# Patient Record
Sex: FEMALE | Race: AMERICAN INDIAN OR ALASKA NATIVE | Employment: UNEMPLOYED | URBAN - METROPOLITAN AREA
[De-identification: names, ages, dates, MRNs, and addresses within clinical notes are randomized per-mention and may not be internally consistent; named-entity substitution may affect disease eponyms.]

---

## 2018-10-26 ENCOUNTER — HOSPITAL ENCOUNTER (OUTPATIENT)
Dept: ULTRASOUND IMAGING | Facility: CLINIC | Age: 23
Discharge: HOME | End: 2018-10-26
Attending: NURSE PRACTITIONER | Admitting: NURSE PRACTITIONER
Payer: COMMERCIAL

## 2018-10-26 DIAGNOSIS — Z32.01 ENCOUNTER FOR PREGNANCY TEST, RESULT POSITIVE: ICD-10-CM

## 2018-10-26 PROCEDURE — 76805 OB US >/= 14 WKS SNGL FETUS: CPT

## 2018-11-28 LAB
C TRACH DNA SPEC QL PROBE+SIG AMP: POSITIVE
HBV SURFACE AG SERPL QL IA: NORMAL
HEMOGLOBIN: 10.8 G/DL (ref 11.7–15.7)
HIV 1+2 AB+HIV1 P24 AG SERPL QL IA: NORMAL
N GONORRHOEA DNA SPEC QL PROBE+SIG AMP: NOT DETECTED
PLATELET # BLD AUTO: 333 10^9/L
RUBELLA ABY IGG: NORMAL
TREPONEMA ANTIBODIES: NORMAL

## 2019-01-16 LAB
GLU GEST SCREEN 1HR 50G: 77
HEMOGLOBIN: 9.9 G/DL (ref 11.7–15.7)

## 2019-03-16 ENCOUNTER — HOSPITAL ENCOUNTER (OUTPATIENT)
Facility: CLINIC | Age: 24
Discharge: HOME | End: 2019-03-16
Attending: OBSTETRICS & GYNECOLOGY | Admitting: OBSTETRICS & GYNECOLOGY
Payer: COMMERCIAL

## 2019-03-16 VITALS
DIASTOLIC BLOOD PRESSURE: 62 MMHG | WEIGHT: 238 LBS | SYSTOLIC BLOOD PRESSURE: 136 MMHG | TEMPERATURE: 98.2 F | BODY MASS INDEX: 37.35 KG/M2 | HEIGHT: 67 IN | RESPIRATION RATE: 20 BRPM

## 2019-03-16 DIAGNOSIS — O47.9 BRAXTON HICKS CONTRACTIONS: Primary | ICD-10-CM

## 2019-03-16 PROBLEM — Z36.89 ENCOUNTER FOR TRIAGE IN PREGNANT PATIENT: Status: ACTIVE | Noted: 2019-03-16

## 2019-03-16 LAB
ALBUMIN UR-MCNC: 30 MG/DL
AMPHETAMINES UR QL SCN: NEGATIVE
APPEARANCE UR: ABNORMAL
BACTERIA #/AREA URNS HPF: ABNORMAL /HPF
BILIRUB UR QL STRIP: NEGATIVE
CANNABINOIDS UR QL: NEGATIVE
COCAINE UR QL: NEGATIVE
COLOR UR AUTO: YELLOW
GLUCOSE UR STRIP-MCNC: NEGATIVE MG/DL
HGB UR QL STRIP: NEGATIVE
KETONES UR STRIP-MCNC: 5 MG/DL
LEUKOCYTE ESTERASE UR QL STRIP: NEGATIVE
MUCOUS THREADS #/AREA URNS LPF: PRESENT /LPF
NITRATE UR QL: NEGATIVE
OPIATES UR QL SCN: NEGATIVE
PCP UR QL SCN: NEGATIVE
PH UR STRIP: 6 PH (ref 5–7)
RBC #/AREA URNS AUTO: 0 /HPF (ref 0–2)
SOURCE: ABNORMAL
SP GR UR STRIP: 1.02 (ref 1–1.03)
SQUAMOUS #/AREA URNS AUTO: 10 /HPF (ref 0–1)
UROBILINOGEN UR STRIP-MCNC: NORMAL MG/DL (ref 0–2)
WBC #/AREA URNS AUTO: 3 /HPF (ref 0–5)

## 2019-03-16 PROCEDURE — G0463 HOSPITAL OUTPT CLINIC VISIT: HCPCS

## 2019-03-16 PROCEDURE — 87491 CHLMYD TRACH DNA AMP PROBE: CPT | Performed by: STUDENT IN AN ORGANIZED HEALTH CARE EDUCATION/TRAINING PROGRAM

## 2019-03-16 PROCEDURE — 87591 N.GONORRHOEAE DNA AMP PROB: CPT | Performed by: STUDENT IN AN ORGANIZED HEALTH CARE EDUCATION/TRAINING PROGRAM

## 2019-03-16 PROCEDURE — 80307 DRUG TEST PRSMV CHEM ANLYZR: CPT | Performed by: OBSTETRICS & GYNECOLOGY

## 2019-03-16 PROCEDURE — 81001 URINALYSIS AUTO W/SCOPE: CPT | Performed by: OBSTETRICS & GYNECOLOGY

## 2019-03-16 RX ORDER — HYDROXYZINE HYDROCHLORIDE 25 MG/1
25-50 TABLET, FILM COATED ORAL EVERY 8 HOURS PRN
Qty: 20 TABLET | Refills: 0 | Status: ON HOLD | OUTPATIENT
Start: 2019-03-16 | End: 2019-03-18

## 2019-03-16 RX ORDER — PRENATAL VIT/IRON FUM/FOLIC AC 27MG-0.8MG
1 TABLET ORAL DAILY
COMMUNITY

## 2019-03-16 RX ORDER — ACETAMINOPHEN 500 MG
500-1000 TABLET ORAL EVERY 6 HOURS PRN
Status: ON HOLD | COMMUNITY
End: 2019-03-18

## 2019-03-16 ASSESSMENT — MIFFLIN-ST. JEOR: SCORE: 1849.25

## 2019-03-16 NOTE — PROGRESS NOTES
[facility]  OB History and Physical      Chula Hitchcock MRN# [id number]   Age: 24 year old YOB: 1995     HPI:  Ms. Chula Hitchcock is a 24 year old  at 39w6d by 19w5d US, who presents with contractions that are 5min apart that started 7 hours ago at 0800. Notes normal fetal movement. Denies headache, vision changes, chest pain, SOB, epigastric/RUQ pain, acute worsening in bilateral lower extremities swelling. Denies vaginal bleeding, change in vaginal discharge/odor, gush of fluid like rupture of membrane, vaginal pain, vaginal itchiness, or vaginal pressure. Denies dysuria, change in urinary frequency, change in urinary urgency, or hematuria.    Pregnancy Complications:  - Class II obesity  - H/o chlamydia at the beginning of this pregnancy, treated  - H/o UTI in this pregnancy  - Anemia in third trimester    Prenatal Labs:   Height: 168.91cm  Weight: 108.18kg, 238lb  BMI: 38.4  T&S: O pos, Rh neg.  Hgb: 10.7 on 2018, 9.9 on 2019  HgbA1c: 4.9 on   Plt: 333 on 2018  GCT: passed  GBS: unknown  Rubella: immune  HIV/HepB/RPR: neg  GC: neg  Chlam: pos  PAP smear: unknown  Tdap given in this pregnancy 2019  Placental location: Anterior, no previa    Ultrasounds  - Anatomy US on 10/26/2018 at 19w5d: wnl    OB History  Obstetric History       T0      L2     SAB0   TAB0   Ectopic0   Multiple0   Live Births2       # Outcome Date GA Lbr Alejandro/2nd Weight Sex Delivery Anes PTL Lv   3 Current            2 Para 14  08:54 3.799 kg (8 lb 6 oz) F Vag-Spont EPI Y BORA      Apgar1:  9                Apgar5: 9   1 Para 13   4.054 kg (8 lb 15 oz) F  EPI N BORA      Denied pregnancy complication, like gHTN, GDM, or other problems.    PMHx:   No past medical history on file.   Denied personal h/o HTN, diabetes.    PSHx:   No past surgical history on file.  Meds:   Medications Prior to Admission   Medication Sig Dispense Refill  Last Dose     acetaminophen (TYLENOL) 500 MG tablet Take 500-1,000 mg by mouth every 6 hours as needed for mild pain   3/16/2019 at Unknown time     ferrous gluconate 325 (36 FE) MG TABS Take 324 mg by mouth 3 times daily (with meals) 90 tablet 3 Past Week at Unknown time     Prenatal Vit-Fe Fumarate-FA (PRENATAL MULTIVITAMIN W/IRON) 27-0.8 MG tablet Take 1 tablet by mouth daily   Past Week at Unknown time     docusate sodium 100 MG tablet Take 100 mg by mouth daily 60 tablet 1 Unknown at Unknown time     Allergies:  No Known Allergies   FmHx: No family history on file.  SocHx: She denied any tobacco, alcohol, or other drug use during this pregnancy.    ROS:   Complete 10-point ROS negative except as noted in HPI.    PE:  Vit:   Patient Vitals for the past 4 hrs:   BP Temp Temp src Heart Rate Resp   19 1435 139/66 98.2  F (36.8  C) Oral 97 20      Gen: Well-appearing, NAD, comfortable   CV: rrr, no mrg   Pulm: Ctab, no wheezes or crackles   Abd: Soft, gravid, non-tender  Ext: trace LE edema b/l  Cx: 2.5/50/-2, posterior, medium; unchanged over > 2hours of monitoring   EFW:  8# by Leopold's    FHT: Baseline 130, modeate variability, accelerations present, no decelerations   [place]: 0-1 contractions in 10 minutes      Assessment/Plan   Chula Hitchcock is a 24 year old  at 39w6d by 19w5d US, who presents with painful contractions and here for rule out of labor.    1. Labor:   - Patient is not making cervical change and only has a few contractions on toco. She is not in labor at this time.  - she should return to the hospital with regular contractions, loss fluid, vaginal bleeding or decreased fetal movement. Discussed with patient  - PNC: Rh positive, Rubella immune, GBS unknown, GCT passed, Placenta anterior    2. FWB:   - Category I FHT.  Continuous EFM and toco.    3. H/o unconfirmed treated chlamydia in this pregnancy  - CG/chlam collected and pending at time of discharge     The patient was  discussed with Dr. Gamboa who is in agreement with the treatment plan.    Ashli Cornelius MD  Ob/Gyn, PGY-1  3/16/2019, 2:04 PM    [facility] staff:  Appreciate note by Dr. Cornelius.  I have seen and examined the patient without the resident. I have reviewed, edited, and agree with the note.      Sharri Gamboa MD, FACOG  3/16/2019  5:10 PM

## 2019-03-16 NOTE — PLAN OF CARE
Data: [facility] at [facility]3.   Reason for maternal/fetal assessment per patient is Contractions (Since 0800)  . Patient is a . Prenatal record reviewed.      Obstetric History       T0      L2     SAB0   TAB0   Ectopic0   Multiple0   Live Births2       # Outcome Date GA Lbr Alejandro/2nd Weight Sex Delivery Anes PTL Lv   3 Current            2 Para 14  08:54 3.799 kg (8 lb 6 oz) F Vag-Spont EPI Y BORA      Apgar1:  9                Apgar5: 9   1 Para 13   4.054 kg (8 lb 15 oz) F  EPI N BORA         Medical History: No past medical history on file.. Gestational Age 39w6d. VSS. Cervix: 2.5/50/-2/posterior-medium consistency- unchanged exams 2 hours apart. Fetal movement present. Patient denies cramping, backache, vaginal discharge, pelvic pressure, UTI symptoms, GI problems, bloody show, vaginal bleeding, edema, headache, visual disturbances, epigastric or URQ pain, abdominal pain, rupture of membranes. Support person present.  Action: Verbal consent for EFM. Triage assessment completed. EFM applied for labor evaluation. Fetal assessment: Presumed adequate fetal oxygenation documented (see flow record). Patient instructed to report change in fetal movement, vaginal leaking of fluid or bleeding, abdominal pain, or any concerns related to the pregnancy to her nurse/physician.   Response: Jamil Cornelius, Mikhail, and Cooper informed of patient arrival and status. Plan per provider is discharge home, instructions to return with labor precautions. Patient verbalized understanding of education and verbalized agreement with plan. Discharged ambulatory at 1715.

## 2019-03-16 NOTE — PLAN OF CARE
Patient up to walk until cervical recheck. Patient notified of results from urine sent, ketones noted and fluids and snacks encouraged.

## 2019-03-17 ENCOUNTER — TELEPHONE (OUTPATIENT)
Dept: OBGYN | Facility: CLINIC | Age: 24
End: 2019-03-17

## 2019-03-17 ENCOUNTER — ANESTHESIA EVENT (OUTPATIENT)
Dept: OBGYN | Facility: CLINIC | Age: 24
End: 2019-03-17
Payer: COMMERCIAL

## 2019-03-17 ENCOUNTER — ANESTHESIA (OUTPATIENT)
Dept: OBGYN | Facility: CLINIC | Age: 24
End: 2019-03-17
Payer: COMMERCIAL

## 2019-03-17 ENCOUNTER — HOSPITAL ENCOUNTER (INPATIENT)
Facility: CLINIC | Age: 24
LOS: 2 days | Discharge: HOME | End: 2019-03-19
Attending: OBSTETRICS & GYNECOLOGY | Admitting: OBSTETRICS & GYNECOLOGY
Payer: COMMERCIAL

## 2019-03-17 DIAGNOSIS — A74.9 CHLAMYDIA INFECTION: Primary | ICD-10-CM

## 2019-03-17 DIAGNOSIS — D62 ACUTE ON CHRONIC BLOOD LOSS ANEMIA: ICD-10-CM

## 2019-03-17 PROBLEM — Z34.90 PREGNANCY: Status: ACTIVE | Noted: 2019-03-17

## 2019-03-17 LAB
ABO + RH BLD: NORMAL
ABO + RH BLD: NORMAL
BASOPHILS # BLD AUTO: 0 10E9/L (ref 0–0.2)
BASOPHILS NFR BLD AUTO: 0.1 %
BLD GP AB SCN SERPL QL: NORMAL
BLOOD BANK CMNT PATIENT-IMP: NORMAL
C TRACH DNA SPEC QL NAA+PROBE: POSITIVE
DIFFERENTIAL METHOD BLD: ABNORMAL
EOSINOPHIL # BLD AUTO: 0 10E9/L (ref 0–0.7)
EOSINOPHIL NFR BLD AUTO: 0.3 %
ERYTHROCYTE [DISTWIDTH] IN BLOOD BY AUTOMATED COUNT: 15 % (ref 10–15)
GLUCOSE BLDC GLUCOMTR-MCNC: 94 MG/DL (ref 70–99)
HCT VFR BLD AUTO: 35.4 % (ref 35–47)
HGB BLD-MCNC: 10.9 G/DL (ref 11.7–15.7)
IMM GRANULOCYTES # BLD: 0 10E9/L (ref 0–0.4)
IMM GRANULOCYTES NFR BLD: 0.2 %
LYMPHOCYTES # BLD AUTO: 1.5 10E9/L (ref 0.8–5.3)
LYMPHOCYTES NFR BLD AUTO: 16.1 %
MCH RBC QN AUTO: 24.2 PG (ref 26.5–33)
MCHC RBC AUTO-ENTMCNC: 30.8 G/DL (ref 31.5–36.5)
MCV RBC AUTO: 79 FL (ref 78–100)
MONOCYTES # BLD AUTO: 0.3 10E9/L (ref 0–1.3)
MONOCYTES NFR BLD AUTO: 3.5 %
N GONORRHOEA DNA SPEC QL NAA+PROBE: NEGATIVE
NEUTROPHILS # BLD AUTO: 7.4 10E9/L (ref 1.6–8.3)
NEUTROPHILS NFR BLD AUTO: 79.8 %
NRBC # BLD AUTO: 0 10*3/UL
NRBC BLD AUTO-RTO: 0 /100
PLATELET # BLD AUTO: 312 10E9/L (ref 150–450)
RBC # BLD AUTO: 4.5 10E12/L (ref 3.8–5.2)
SPECIMEN EXP DATE BLD: NORMAL
SPECIMEN SOURCE: ABNORMAL
SPECIMEN SOURCE: NORMAL
WBC # BLD AUTO: 9.3 10E9/L (ref 4–11)

## 2019-03-17 PROCEDURE — 86900 BLOOD TYPING SEROLOGIC ABO: CPT | Performed by: STUDENT IN AN ORGANIZED HEALTH CARE EDUCATION/TRAINING PROGRAM

## 2019-03-17 PROCEDURE — 59025 FETAL NON-STRESS TEST: CPT

## 2019-03-17 PROCEDURE — 25000128 H RX IP 250 OP 636: Performed by: STUDENT IN AN ORGANIZED HEALTH CARE EDUCATION/TRAINING PROGRAM

## 2019-03-17 PROCEDURE — 00HU33Z INSERTION OF INFUSION DEVICE INTO SPINAL CANAL, PERCUTANEOUS APPROACH: ICD-10-PCS | Performed by: ANESTHESIOLOGY

## 2019-03-17 PROCEDURE — 86850 RBC ANTIBODY SCREEN: CPT | Performed by: STUDENT IN AN ORGANIZED HEALTH CARE EDUCATION/TRAINING PROGRAM

## 2019-03-17 PROCEDURE — 86901 BLOOD TYPING SEROLOGIC RH(D): CPT | Performed by: STUDENT IN AN ORGANIZED HEALTH CARE EDUCATION/TRAINING PROGRAM

## 2019-03-17 PROCEDURE — 25000125 ZZHC RX 250: Performed by: ANESTHESIOLOGY

## 2019-03-17 PROCEDURE — G0463 HOSPITAL OUTPT CLINIC VISIT: HCPCS | Mod: 25

## 2019-03-17 PROCEDURE — G0463 HOSPITAL OUTPT CLINIC VISIT: HCPCS

## 2019-03-17 PROCEDURE — 00000146 ZZHCL STATISTIC GLUCOSE BY METER IP

## 2019-03-17 PROCEDURE — 25000128 H RX IP 250 OP 636: Performed by: ANESTHESIOLOGY

## 2019-03-17 PROCEDURE — 25000125 ZZHC RX 250: Performed by: STUDENT IN AN ORGANIZED HEALTH CARE EDUCATION/TRAINING PROGRAM

## 2019-03-17 PROCEDURE — 12000001 ZZH R&B MED SURG/OB UMMC

## 2019-03-17 PROCEDURE — 85025 COMPLETE CBC W/AUTO DIFF WBC: CPT | Performed by: STUDENT IN AN ORGANIZED HEALTH CARE EDUCATION/TRAINING PROGRAM

## 2019-03-17 PROCEDURE — 3E0R3BZ INTRODUCTION OF ANESTHETIC AGENT INTO SPINAL CANAL, PERCUTANEOUS APPROACH: ICD-10-PCS | Performed by: ANESTHESIOLOGY

## 2019-03-17 PROCEDURE — 25000128 H RX IP 250 OP 636

## 2019-03-17 PROCEDURE — 25000132 ZZH RX MED GY IP 250 OP 250 PS 637: Performed by: STUDENT IN AN ORGANIZED HEALTH CARE EDUCATION/TRAINING PROGRAM

## 2019-03-17 PROCEDURE — 25800030 ZZH RX IP 258 OP 636: Performed by: STUDENT IN AN ORGANIZED HEALTH CARE EDUCATION/TRAINING PROGRAM

## 2019-03-17 PROCEDURE — 0064U ANTB TP TOTAL&RPR IA QUAL: CPT | Performed by: STUDENT IN AN ORGANIZED HEALTH CARE EDUCATION/TRAINING PROGRAM

## 2019-03-17 RX ORDER — OXYTOCIN 10 [USP'U]/ML
INJECTION, SOLUTION INTRAMUSCULAR; INTRAVENOUS
Status: DISCONTINUED
Start: 2019-03-17 | End: 2019-03-18 | Stop reason: HOSPADM

## 2019-03-17 RX ORDER — OXYTOCIN/0.9 % SODIUM CHLORIDE 30/500 ML
100-340 PLASTIC BAG, INJECTION (ML) INTRAVENOUS CONTINUOUS PRN
Status: DISCONTINUED | OUTPATIENT
Start: 2019-03-17 | End: 2019-03-18

## 2019-03-17 RX ORDER — IBUPROFEN 800 MG/1
800 TABLET, FILM COATED ORAL
Status: COMPLETED | OUTPATIENT
Start: 2019-03-17 | End: 2019-03-18

## 2019-03-17 RX ORDER — AZITHROMYCIN 500 MG/1
1000 TABLET, FILM COATED ORAL ONCE
Status: COMPLETED | OUTPATIENT
Start: 2019-03-17 | End: 2019-03-17

## 2019-03-17 RX ORDER — FENTANYL CITRATE 50 UG/ML
50-100 INJECTION, SOLUTION INTRAMUSCULAR; INTRAVENOUS
Status: DISCONTINUED | OUTPATIENT
Start: 2019-03-17 | End: 2019-03-18

## 2019-03-17 RX ORDER — SODIUM CHLORIDE, SODIUM LACTATE, POTASSIUM CHLORIDE, CALCIUM CHLORIDE 600; 310; 30; 20 MG/100ML; MG/100ML; MG/100ML; MG/100ML
INJECTION, SOLUTION INTRAVENOUS CONTINUOUS
Status: DISCONTINUED | OUTPATIENT
Start: 2019-03-17 | End: 2019-03-18

## 2019-03-17 RX ORDER — MISOPROSTOL 200 UG/1
TABLET ORAL
Status: DISCONTINUED
Start: 2019-03-17 | End: 2019-03-18 | Stop reason: HOSPADM

## 2019-03-17 RX ORDER — LIDOCAINE 40 MG/G
CREAM TOPICAL
Status: DISCONTINUED | OUTPATIENT
Start: 2019-03-17 | End: 2019-03-18

## 2019-03-17 RX ORDER — AZITHROMYCIN 500 MG/1
1000 TABLET, FILM COATED ORAL DAILY
Qty: 2 TABLET | Refills: 0 | Status: ON HOLD | OUTPATIENT
Start: 2019-03-17 | End: 2019-03-18

## 2019-03-17 RX ORDER — BUPIVACAINE HYDROCHLORIDE 2.5 MG/ML
INJECTION, SOLUTION INFILTRATION; PERINEURAL PRN
Status: DISCONTINUED | OUTPATIENT
Start: 2019-03-17 | End: 2019-03-20 | Stop reason: HOSPADM

## 2019-03-17 RX ORDER — EPHEDRINE SULFATE 50 MG/ML
INJECTION, SOLUTION INTRAMUSCULAR; INTRAVENOUS; SUBCUTANEOUS
Status: DISCONTINUED
Start: 2019-03-17 | End: 2019-03-18 | Stop reason: HOSPADM

## 2019-03-17 RX ORDER — OXYTOCIN 10 [USP'U]/ML
10 INJECTION, SOLUTION INTRAMUSCULAR; INTRAVENOUS
Status: DISCONTINUED | OUTPATIENT
Start: 2019-03-17 | End: 2019-03-18

## 2019-03-17 RX ORDER — ONDANSETRON 2 MG/ML
4 INJECTION INTRAMUSCULAR; INTRAVENOUS EVERY 6 HOURS PRN
Status: DISCONTINUED | OUTPATIENT
Start: 2019-03-17 | End: 2019-03-17

## 2019-03-17 RX ORDER — NALOXONE HYDROCHLORIDE 0.4 MG/ML
.1-.4 INJECTION, SOLUTION INTRAMUSCULAR; INTRAVENOUS; SUBCUTANEOUS
Status: DISCONTINUED | OUTPATIENT
Start: 2019-03-17 | End: 2019-03-18

## 2019-03-17 RX ORDER — ONDANSETRON 2 MG/ML
4 INJECTION INTRAMUSCULAR; INTRAVENOUS EVERY 6 HOURS PRN
Status: DISCONTINUED | OUTPATIENT
Start: 2019-03-17 | End: 2019-03-18

## 2019-03-17 RX ORDER — OXYCODONE AND ACETAMINOPHEN 5; 325 MG/1; MG/1
1 TABLET ORAL
Status: DISCONTINUED | OUTPATIENT
Start: 2019-03-17 | End: 2019-03-18

## 2019-03-17 RX ORDER — OXYTOCIN/0.9 % SODIUM CHLORIDE 30/500 ML
PLASTIC BAG, INJECTION (ML) INTRAVENOUS
Status: DISCONTINUED
Start: 2019-03-17 | End: 2019-03-18 | Stop reason: HOSPADM

## 2019-03-17 RX ORDER — TERBUTALINE SULFATE 1 MG/ML
0.25 INJECTION, SOLUTION SUBCUTANEOUS
Status: DISCONTINUED | OUTPATIENT
Start: 2019-03-17 | End: 2019-03-18

## 2019-03-17 RX ORDER — ACETAMINOPHEN 325 MG/1
650 TABLET ORAL EVERY 4 HOURS PRN
Status: DISCONTINUED | OUTPATIENT
Start: 2019-03-17 | End: 2019-03-18

## 2019-03-17 RX ORDER — METHYLERGONOVINE MALEATE 0.2 MG/ML
200 INJECTION INTRAVENOUS
Status: DISCONTINUED | OUTPATIENT
Start: 2019-03-17 | End: 2019-03-18

## 2019-03-17 RX ORDER — OXYTOCIN/0.9 % SODIUM CHLORIDE 30/500 ML
1-24 PLASTIC BAG, INJECTION (ML) INTRAVENOUS CONTINUOUS
Status: DISCONTINUED | OUTPATIENT
Start: 2019-03-17 | End: 2019-03-18

## 2019-03-17 RX ORDER — FENTANYL/BUPIVACAINE/NS/PF 2-1250MCG
PLASTIC BAG, INJECTION (ML) INJECTION CONTINUOUS PRN
Status: DISCONTINUED | OUTPATIENT
Start: 2019-03-17 | End: 2019-03-20 | Stop reason: HOSPADM

## 2019-03-17 RX ORDER — FENTANYL/BUPIVACAINE/NS/PF 2-1250MCG
PLASTIC BAG, INJECTION (ML) INJECTION
Status: COMPLETED
Start: 2019-03-17 | End: 2019-03-17

## 2019-03-17 RX ORDER — LIDOCAINE HYDROCHLORIDE 10 MG/ML
INJECTION, SOLUTION EPIDURAL; INFILTRATION; INTRACAUDAL; PERINEURAL
Status: DISCONTINUED
Start: 2019-03-17 | End: 2019-03-18 | Stop reason: HOSPADM

## 2019-03-17 RX ORDER — CARBOPROST TROMETHAMINE 250 UG/ML
250 INJECTION, SOLUTION INTRAMUSCULAR
Status: DISCONTINUED | OUTPATIENT
Start: 2019-03-17 | End: 2019-03-18

## 2019-03-17 RX ADMIN — AZITHROMYCIN MONOHYDRATE 250 MG: 500 INJECTION, POWDER, LYOPHILIZED, FOR SOLUTION INTRAVENOUS at 22:10

## 2019-03-17 RX ADMIN — OXYTOCIN-SODIUM CHLORIDE 0.9% IV SOLN 30 UNIT/500ML 2 MILLI-UNITS/MIN: 30-0.9/5 SOLUTION at 23:09

## 2019-03-17 RX ADMIN — Medication 10 ML/HR: at 22:01

## 2019-03-17 RX ADMIN — LIDOCAINE HYDROCHLORIDE,EPINEPHRINE BITARTRATE 4 ML: 15; .005 INJECTION, SOLUTION EPIDURAL; INFILTRATION; INTRACAUDAL; PERINEURAL at 21:59

## 2019-03-17 RX ADMIN — Medication: at 22:00

## 2019-03-17 RX ADMIN — AZITHROMYCIN MONOHYDRATE 1000 MG: 500 TABLET ORAL at 20:29

## 2019-03-17 RX ADMIN — SODIUM CHLORIDE, POTASSIUM CHLORIDE, SODIUM LACTATE AND CALCIUM CHLORIDE: 600; 310; 30; 20 INJECTION, SOLUTION INTRAVENOUS at 22:11

## 2019-03-17 RX ADMIN — SODIUM CHLORIDE, POTASSIUM CHLORIDE, SODIUM LACTATE AND CALCIUM CHLORIDE 1000 ML: 600; 310; 30; 20 INJECTION, SOLUTION INTRAVENOUS at 21:15

## 2019-03-17 RX ADMIN — BUPIVACAINE HYDROCHLORIDE 5 ML: 2.5 INJECTION, SOLUTION INFILTRATION; PERINEURAL at 22:00

## 2019-03-17 ASSESSMENT — ENCOUNTER SYMPTOMS: SEIZURES: 0

## 2019-03-17 NOTE — TELEPHONE ENCOUNTER
Called patient to discuss +chlmaydia results. Had prior positive with treatment in January. Discussed importance of partner treatment. Rx sent to desired pharmacy. All questions answered.     Jen Hopson MD

## 2019-03-18 LAB — T PALLIDUM AB SER QL: NONREACTIVE

## 2019-03-18 PROCEDURE — 25000132 ZZH RX MED GY IP 250 OP 250 PS 637: Performed by: STUDENT IN AN ORGANIZED HEALTH CARE EDUCATION/TRAINING PROGRAM

## 2019-03-18 PROCEDURE — 12000001 ZZH R&B MED SURG/OB UMMC

## 2019-03-18 PROCEDURE — 72200001 ZZH LABOR CARE VAGINAL DELIVERY SINGLE

## 2019-03-18 PROCEDURE — 25000128 H RX IP 250 OP 636: Performed by: STUDENT IN AN ORGANIZED HEALTH CARE EDUCATION/TRAINING PROGRAM

## 2019-03-18 PROCEDURE — 40000977 ZZH STATISTIC ATTENDANCE AT DELIVERY

## 2019-03-18 RX ORDER — OXYTOCIN/0.9 % SODIUM CHLORIDE 30/500 ML
100 PLASTIC BAG, INJECTION (ML) INTRAVENOUS CONTINUOUS
Status: DISCONTINUED | OUTPATIENT
Start: 2019-03-18 | End: 2019-03-19 | Stop reason: HOSPADM

## 2019-03-18 RX ORDER — BISACODYL 10 MG
10 SUPPOSITORY, RECTAL RECTAL DAILY PRN
Status: DISCONTINUED | OUTPATIENT
Start: 2019-03-20 | End: 2019-03-19 | Stop reason: HOSPADM

## 2019-03-18 RX ORDER — AMOXICILLIN 250 MG
1 CAPSULE ORAL 2 TIMES DAILY
Status: DISCONTINUED | OUTPATIENT
Start: 2019-03-18 | End: 2019-03-19 | Stop reason: HOSPADM

## 2019-03-18 RX ORDER — AMOXICILLIN 250 MG
2 CAPSULE ORAL 2 TIMES DAILY
Status: DISCONTINUED | OUTPATIENT
Start: 2019-03-18 | End: 2019-03-19 | Stop reason: HOSPADM

## 2019-03-18 RX ORDER — ACETAMINOPHEN 325 MG/1
650 TABLET ORAL EVERY 4 HOURS PRN
Qty: 60 TABLET | Refills: 0 | Status: SHIPPED | OUTPATIENT
Start: 2019-03-18

## 2019-03-18 RX ORDER — CEFAZOLIN SODIUM 2 G/100ML
2 INJECTION, SOLUTION INTRAVENOUS ONCE
Status: COMPLETED | OUTPATIENT
Start: 2019-03-18 | End: 2019-03-18

## 2019-03-18 RX ORDER — LANOLIN 100 %
OINTMENT (GRAM) TOPICAL
Status: DISCONTINUED | OUTPATIENT
Start: 2019-03-18 | End: 2019-03-19 | Stop reason: HOSPADM

## 2019-03-18 RX ORDER — ACETAMINOPHEN 325 MG/1
650 TABLET ORAL EVERY 4 HOURS PRN
Status: DISCONTINUED | OUTPATIENT
Start: 2019-03-18 | End: 2019-03-19 | Stop reason: HOSPADM

## 2019-03-18 RX ORDER — IBUPROFEN 800 MG/1
800 TABLET, FILM COATED ORAL EVERY 6 HOURS PRN
Status: DISCONTINUED | OUTPATIENT
Start: 2019-03-18 | End: 2019-03-19 | Stop reason: HOSPADM

## 2019-03-18 RX ORDER — HYDROCORTISONE 2.5 %
CREAM (GRAM) TOPICAL 3 TIMES DAILY PRN
Status: DISCONTINUED | OUTPATIENT
Start: 2019-03-18 | End: 2019-03-19 | Stop reason: HOSPADM

## 2019-03-18 RX ORDER — NALOXONE HYDROCHLORIDE 0.4 MG/ML
.1-.4 INJECTION, SOLUTION INTRAMUSCULAR; INTRAVENOUS; SUBCUTANEOUS
Status: DISCONTINUED | OUTPATIENT
Start: 2019-03-18 | End: 2019-03-19 | Stop reason: HOSPADM

## 2019-03-18 RX ORDER — HYDROXYZINE HYDROCHLORIDE 25 MG/1
25 TABLET, FILM COATED ORAL EVERY 4 HOURS PRN
Status: DISCONTINUED | OUTPATIENT
Start: 2019-03-18 | End: 2019-03-19 | Stop reason: HOSPADM

## 2019-03-18 RX ORDER — MISOPROSTOL 200 UG/1
800 TABLET ORAL
Status: DISCONTINUED | OUTPATIENT
Start: 2019-03-18 | End: 2019-03-19 | Stop reason: HOSPADM

## 2019-03-18 RX ORDER — IBUPROFEN 800 MG/1
800 TABLET, FILM COATED ORAL EVERY 6 HOURS PRN
Qty: 60 TABLET | Refills: 0 | Status: SHIPPED | OUTPATIENT
Start: 2019-03-18

## 2019-03-18 RX ORDER — OXYTOCIN 10 [USP'U]/ML
10 INJECTION, SOLUTION INTRAMUSCULAR; INTRAVENOUS
Status: DISCONTINUED | OUTPATIENT
Start: 2019-03-18 | End: 2019-03-19 | Stop reason: HOSPADM

## 2019-03-18 RX ORDER — OXYTOCIN/0.9 % SODIUM CHLORIDE 30/500 ML
340 PLASTIC BAG, INJECTION (ML) INTRAVENOUS CONTINUOUS PRN
Status: DISCONTINUED | OUTPATIENT
Start: 2019-03-18 | End: 2019-03-19 | Stop reason: HOSPADM

## 2019-03-18 RX ADMIN — ACETAMINOPHEN 650 MG: 325 TABLET, FILM COATED ORAL at 15:53

## 2019-03-18 RX ADMIN — SENNOSIDES AND DOCUSATE SODIUM 1 TABLET: 8.6; 5 TABLET ORAL at 08:33

## 2019-03-18 RX ADMIN — HYDROXYZINE HYDROCHLORIDE 25 MG: 25 TABLET ORAL at 20:36

## 2019-03-18 RX ADMIN — SENNOSIDES AND DOCUSATE SODIUM 1 TABLET: 8.6; 5 TABLET ORAL at 20:37

## 2019-03-18 RX ADMIN — ACETAMINOPHEN 650 MG: 325 TABLET, FILM COATED ORAL at 20:36

## 2019-03-18 RX ADMIN — ACETAMINOPHEN 650 MG: 325 TABLET, FILM COATED ORAL at 08:32

## 2019-03-18 RX ADMIN — IBUPROFEN 800 MG: 800 TABLET, FILM COATED ORAL at 12:14

## 2019-03-18 RX ADMIN — IBUPROFEN 800 MG: 800 TABLET, FILM COATED ORAL at 18:01

## 2019-03-18 RX ADMIN — ACETAMINOPHEN 650 MG: 325 TABLET, FILM COATED ORAL at 12:14

## 2019-03-18 RX ADMIN — CEFAZOLIN SODIUM 2 G: 2 INJECTION, SOLUTION INTRAVENOUS at 05:54

## 2019-03-18 RX ADMIN — IBUPROFEN 800 MG: 800 TABLET ORAL at 04:54

## 2019-03-18 NOTE — PLAN OF CARE
Pt walked to bathroom without difficulties & voided large amount. Lochia within small to scant with no clots. Pt breast feeding well & feels independent with latching. Will continue on supportive cares.

## 2019-03-18 NOTE — PROGRESS NOTES
[facility]  Labor Progress Note    Subjective:  Patient comfortable with epidural. Feels like water broke.    Objective:   Patient Vitals for the past 4 hrs:   BP Temp Temp src Heart Rate Resp SpO2   19 137/61 -- -- -- -- --   19 134/61 -- -- -- -- --   19 133/60 -- -- -- -- --   19 134/58 -- -- -- -- 99 %   19 128/60 -- -- -- -- 98 %   19 126/58 -- -- -- -- --   19 125/60 -- -- -- -- 98 %   19 131/62 -- -- -- -- --   19 130/66 -- -- -- -- --   19 139/62 -- -- -- -- 99 %   19 137/69 98.6  F (37  C) Oral -- -- (!) 87 %   19 131/75 -- -- -- -- --   19 144/79 -- -- -- -- --   19 1900 139/77 -- -- -- -- --   19 1858 139/77 99  F (37.2  C) Oral 79 18 --     SVE: /0  Membranes: SROM 2300, clear fluid    FHT: Baseline 150, moderate variability, present accelerations, absent decelerations  [place]: 1-2 contractions in 10 minutes    Assessment  Ms. Chula Hitchcock is a 24 year old , at 40w0d by 19w5d US, who presents with painful contractions.     Plan  - Labor               - Admit to L&D for spontaneous labor. Expectant management               - Fen/GI: Clear liquid diet, IVF               - SVE: PRN               - Membranes: SROM clear                  - contractions spaced 1-2/10. Will augment with pitocin               - Pain management: Desires epidural for analgesia               - Anticipate progression      - Positive CHPCRT               - s/p PO azithromycin 1000mg and 250mg IV      - Fetal Well Being               - Category I FHT. Reactive and reassuring               - Cephalic by BSUS. EFW                - Continue EFM and [place]     - PNC:                - Rh positive. Rubella immune. GBS unknown, antibiotics not indicated. No intervention indicated.                - Placenta: anterior         Karrie Myhre, DO  OB/GYN  Resident, PGY-2  3/17/2019 10:56 PM

## 2019-03-18 NOTE — PROGRESS NOTES
Brief progress note    In to see patient to discuss need for partner treatment for chlamydia which patient was diagnosed with and treated for yesterday. Patient and partner Quoc expressed understanding. Called in Rx for 1g azithromycin for Quoc to outpatient pharmacy. Outpatient pharmacy to send to [facility]. Updated patient's RN and asked for confirmation that Quoc gets meds.    Karen Arora MD  Ob/Gyn PGY-2

## 2019-03-18 NOTE — L&D DELIVERY NOTE
OB Vaginal Delivery Note    Delivery Note:     Chula Hitchcock is a 24 year old now  who presented at 40w0d for spontaneous labor.  Pregnancy complications included Anemia and chlamydia. She received PO Azithromycin 1000mg in triage and an additional 250mg IV for positive chlamydia test that was collected 3/16/19.  Labor course was uncomplicated.  She was noted to be complete at 0400.  She started pushing at 0407 and had a spontaneous vaginal delivery of a viable female infant in OA position at 0407. No nuchal cord was noted.  Apgars of 9 and 9 with weight pending.  The cord was double clamped after 60 seconds and cut.  A cord segment and cord blood were obtained.  Attempted spontaneous delivery of the placenta but brisk bleeding was noted. Exam revealed placenta in the cervical os with membranes adhered to uterus. Manual sweep was performed and placenta was delivered without further difficulty.   The uterus was noted to be firm with IV pitocin and fundal massage.  The perineum was assessed for lacerations and a small perineal abrasion and left sided labial abrasion was noted.  The lacerations were hemostatic and did not require repair. Total QBL was 122.  The placenta appeared intact with a 3V umbilical cord.  Dr. Jose was present for the entire procedure.     Alicia Myhre, DO  OB/GYN Resident, PGY-2  3/18/2019 5:05 AM      OB/GYN Staff --  Agree with note as above. I was present for birth.   MD Mabel        Chula Hitchcock MRN# [id number]   Age: 24 year old YOB: 1995       GA: 40w1d  GP:   Labor Complications: None   EBL:    mL  QBL: 122 mL  Delivery Type: Vaginal, Spontaneous   ROM to Delivery Time: rupture date, rupture time, delivery date, or delivery time have not been documented  [place] Weight:      1 Minute 5 Minute 10 Minute   Apgar Totals: 9    9          MYHRE, ALICIA     Delivery Details:  Chula Hitchcock, a 24 year old  female delivered a  viable infant with apgars of 9   and 9  . Patient was fully dilated and pushing after 28  hours 0  minutes in active labor. Delivery was via vaginal, spontaneous  to a sterile field under epidural  anesthesia. Infant delivered in vertex     occiput  anterior  position. Anterior and posterior shoulders delivered without difficulty. The cord was clamped, cut twice and 3 vessels  were noted. Cord blood was obtained in routine fashion with the following disposition:   .      Cord complications: none   Placenta delivered at 3/18/2019  4:15 AM . Placental disposition was Hospital disposal . Fundal massage performed and fundus found to be firm.     Episiotomy: none    Perineum, vagina, cervix were inspected, and the following lacerations were noted:   Perineal lacerations: 1st   periurethral laceration: left                 No lacerations requiring repair    Excellent hemostasis was noted. Needle count correct. Infant and patient in delivery room in good and stable condition.        Labor Event Times    Labor onset date:  3/17/19 Onset time:  12:00 AM   Dilation complete date:  3/18/19 Complete time:   4:00 AM   Start pushing date/time:  3/18/2019 0407      Labor Length    1st Stage (hrs):  28 (min):  0   2nd Stage (hrs):  0 (min):  7   3rd Stage (hrs):  0 (min):  8      Labor Events     labor?:  No   steroids:  None  Labor Type:  Spontaneous     Antibiotics received during labor?:  Yes     Rupture identifier:  Rupture 1  Rupture date/time: 3/18/19 2300   Rupture type:  Spontaneous rupture of membranes occuring during spontaneous labor or augmentation  Fluid color:  Meconium  Fluid odor:  Normal     1:1 continuous labor support provided by?:  RN       Delivery/Placenta Date and Time    Delivery Date:  3/18/19 Delivery Time:   4:07 AM   Placenta Date/Time:  3/18/2019  4:15 AM  Oxytocin given at the time of delivery:  after delivery of baby     Vaginal Counts     Initial count performed by 2 team members:    Two Team Members   Kristen Dexter, RN Dr. Myhre       [place] [employer] [place] [employer]   Initial counts 2  5    Added to count       Final counts 2  5    Placed during labor Accounted for at the end of labor   No    No    No     Final count performed by 2 team members:   Two Team Members   Kristen Dexter, RN Dr. Myhre      Final count correct?:  Yes     Apgars    Living status:  Living   1 Minute 5 Minute 10 Minute 15 Minute 20 Minute   Skin color: 1  1       Heart rate: 2  2       Reflex irritability: 2  2       Muscle tone: 2  2       Respiratory effort: 2  2       Total: 9  9       Apgars assigned by:  YASEMIN RAMAN     Cord    Vessels:  3 Vessels Complications:  None    Gases Sent?:  No       Resuscitation    Methods:  None  [place] Care at Delivery:  NSD FEMALE.  INFANT PLACED ON MOM'S ABD ET DRIED WITH WARMED BLANKETS.  INFANT WITH SPONTANEOUS LUSTY CRY.  INFANT REMAINS WITH MOM.     Skin to Skin and Feeding Plan    Skin to skin initiation date/time: 1/3/1841    Skin to skin with:  Mother  Skin to skin end date/time:     Breastfeeding initiated date/time:  3/18/2019 0430  How do you plan to feed your baby:  Breastfeeding     Labor Events and Shoulder Dystocia    Fetal Tracing Prior to Delivery:  Category 1  Shoulder dystocia present?:  Neg     Delivery (Maternal) (Provider to Complete) ([id number])    Episiotomy:  None  Perineal lacerations:  1st Repaired?:  No   Periurethral laceration:  left Repaired?:  No   Vaginal laceration?:  No    Cervical laceration?:  No       Blood Loss  Mother: Chula Hitchcock #[id number]   Start of Mother's Information    IO Blood Loss  19 0000 - 19 0505    Total QBL Blood Loss (mL) Hospital Encounter 122 mL    Total  122 mL         End of Mother's Information  Mother: Chula Hitchcock #[id number]         Delivery - Provider to Complete (802085)    Delivering clinician:  Althea Jose MD  Delivery Type (Choose the 1 that will go to  the Birth History):  Vaginal, Spontaneous   Other personnel:   Provider Role   Myhre, Alicia, DO Resident         Placenta    Delayed Cord Clamping:  Done  Date/Time:  3/18/2019  4:15 AM  Removal:  Manual Removal  Comments:  Uterine sweep, retained membranes  Disposition:  Hospital disposal     Anesthesia    Method:  Epidural  Cervical dilation at placement:  8-10          Presentation and Position    Presentation:  Vertex   Occiput Anterior           Alicia Myhre, DO

## 2019-03-18 NOTE — PLAN OF CARE
Data: Vital signs within normal limits. Postpartum checks within normal limits - see flow record. Patient eating and drinking normally. Patient able to empty bladder independently and is up ambulating. No apparent signs of infection. Perineum  healing well. Patient performing self cares and is able to care for infant.  Action: Patient medicated during the shift for cramping. See MAR. Patient reassessed within 1 hour after each medication and pain was improved - patient stated she was comfortable. Patient education done about Voiding, perineal care, ambulating. See flow record.  Response: Positive attachment behaviors observed with infant. Support persons are present.   Plan: Anticipate discharge on Tuesday.

## 2019-03-18 NOTE — PROGRESS NOTES
Post Partum Progress Note  PPD#1  Subjective:  She is resting comfortably in bed this morning.  Complains of some cramping abdominal pain, but is well controlled on current medication regimen. Tolerating PO intake. Voiding without difficulty, passing gas.  Ambulating without dizziness or difficulty.  She denies headache, changes in vision, nausea/vomiting, chest pain, shortness of breath, RUQ pain, or worsening edema.  Plans to breastfeed.    Objective:  Vitals:    19 0742 19 1214 19 1557 19 0027   BP:  118/76 131/69 120/70   Pulse:   79 69   Resp: 16 16 16 18   Temp: 98.8  F (37.1  C) 98.3  F (36.8  C) 98  F (36.7  C) 98.2  F (36.8  C)   TempSrc: Oral Oral Oral Oral   SpO2:  98%  98%     General: NAD  Abd: Soft, non-tender, non-distended. Fundus is firm and below the umbilicus.    Ext: trace to 1+ LE edema. No calf tenderness.    Assessment/Plan:  Chula Hitchcock is a 24 year old  female who is PPD#1 s/p uncomplicated .    - Encourage routine post-operative goals including ambulation and incentive spirometry  - PNC: Rh pos. Rubella immune. No intervention indicated.  - Pain: controlled on oral medications  - Heme: Hgb 10.9> > 9.8 this am.  Will discharge home with iron.  - GI: continue anti-emetics and stool softeners as needed.  - : Voiding spontaneously.  - Infant: Stable in room  - Feeding: Plans on breastfeeding.  - BC: Plans on Nexplanon    Discharge to home today    Tahira Donohue MD  PGY-3 OB/GYN  [phone] (OB G2 Pager)  OB/GYN Staff -- Pt seen and examined by me. Agree with note as above.  MD Mabel

## 2019-03-18 NOTE — PROGRESS NOTES
Data: Chula Hitchcock transferred to [id number] via wheelchair at 0615. Baby transferred via parent's arms.  Action: Receiving unit notified of transfer: Yes. Patient and family notified of room change. Report given to DANISHA Hernandez. Belongings sent to receiving unit. Accompanied by Registered Nurse. Oriented patient to surroundings. Call light within reach. ID bands double-checked with receiving RN.  Response: Patient tolerated transfer and is stable.

## 2019-03-18 NOTE — PLAN OF CARE
Pt vitals are stable. Fundus firm at U/1 with small lochia. Has Cramping pain while breast feeding. IBU & Tylenol given. Due to void. Will continue on supportive cares.

## 2019-03-18 NOTE — H&P
[facility]  OB History and Physical      Chula Hitchcock    MRN# [id number]  YOB: 1995      CC:  Painful contractions    HPI:  Ms. Chula Hitchcock is a 24 year old  at 40w0d by 19w5d US, who presents with painful contractions. They have been happening since leaving the hospital yesterday, but states they have increased in severity over the last several hours. They are happening every 5-10 minutes and last for about 1 minute. She reports fetal movement. She reports some dark grey discharge with some possible light bleeding, but denies edis vaginal bleeding. Denies loss of fluid. She denies HA, changes in vision, RUQ pain, N/V/D, or increased edema.    Pregnancy Complicated by:  -Class II obesity  -H/O chlamydia s/p treatment   -New positive chlamydia test  -H/O UTI this pregnancy   -Iron deficiency anemia in 3rd trimester    Prenatal Labs:   Lab Results   Component Value Date    ABO O 2014    RH  Pos 2014    AS Neg 2014    HEPBANG non-reactive 2018    CHPCRT Positive (A) 2019    GCPCRT Negative 2019    RUBELLAABIGG immune 2018    HGB 9.9 (A) 2019    HIV non reactive 2014       GBS Status:   No results found for: GBS    Ultrasounds  1. Anatomy US on 10/26/2018 at 19w5d: wnl, anterior    OB History  Obstetric History       T0      L2     SAB0   TAB0   Ectopic0   Multiple0   Live Births2       # Outcome Date GA Lbr Alejandro/2nd Weight Sex Delivery Anes PTL Lv   3 Current            2 Para 14  08:54 3.799 kg (8 lb 6 oz) F Vag-Spont EPI Y BORA      Apgar1:  9                Apgar5: 9   1 Para 13   4.054 kg (8 lb 15 oz) F  EPI N BORA          PMHx:   No past medical history on file.  PSHx:   No past surgical history on file.  Meds:   Medications Prior to Admission   Medication Sig Dispense Refill Last Dose     acetaminophen (TYLENOL) 500 MG tablet Take 500-1,000 mg by mouth every 6 hours  as needed for mild pain   3/16/2019 at Unknown time     azithromycin (ZITHROMAX) 500 MG tablet Take 2 tablets (1,000 mg) by mouth daily for 1 day 2 tablet 0      docusate sodium 100 MG tablet Take 100 mg by mouth daily 60 tablet 1 Unknown at Unknown time     ferrous gluconate 325 (36 FE) MG TABS Take 324 mg by mouth 3 times daily (with meals) 90 tablet 3 Past Week at Unknown time     hydrOXYzine (ATARAX) 25 MG tablet Take 1-2 tablets (25-50 mg) by mouth every 8 hours as needed for itching (sleep, pain) 20 tablet 0      Prenatal Vit-Fe Fumarate-FA (PRENATAL MULTIVITAMIN W/IRON) 27-0.8 MG tablet Take 1 tablet by mouth daily   Past Week at Unknown time     Allergies:  No Known Allergies   FmHx: No family history on file.      ROS:   Complete 10-point ROS negative except as noted in HPI .She denies headache, blurry vision, chest pain, shortness of breath, RUQ pain, nausea, vomiting, dysuria, hematuria or extremity edema.    PE:  Vit:   Patient Vitals for the past 4 hrs:   BP Temp Temp src Heart Rate Resp   19 1858 139/77 99  F (37.2  C) Oral 79 18      Gen: Well-appearing, NAD, some discomfort during contractions  Pulm: No increased work of breathing   Abd: Soft, gravid, non-tender, +BS   Ext: No LE edema b/l  Cx: 4/80%/-2 (3/17 at 1930)    Pres:  cephalic by BSUS  EFW:  8 by leopold  Memb: intact              FHT: Baseline 155, moderate variability, accelerations, no decelerations   [place]: 2 contractions in 10 minutes      Assessment  Ms. Chula Hitchcock is a 24 year old , at 40w0d by 19w5d US, who presents with painful contractions.    Plan  - Labor   - Admit to L&D for spontaneous labor. Expectant management   - Fen/GI: Clear liquid diet, IVF   - SVE: PRN   - Membranes: intact   - Pain management: Desires epidural for analgesia   - Anticipate progression     - Positive CHPCRT   - s/p PO azithromycin    - Fetal Well Being   - Category I FHT. Reactive and reassuring   - Cephalic by BSUS. EFW    -  Continue EFM and [place]    - PNC:    - Rh positive. Rubella immune. GBS unknown, antibiotics not indicated. No intervention indicated.    - Placenta: anterior    The patient was discussed with Dr. Jose who is in agreement with the treatment plan.    Nikunj Hamilton, MS3    Resident/Fellow Attestation   I, Alicia Myhre, was present with the medical student who participated in the service and in the documentation of the note.  I have verified the history and personally performed the physical exam and medical decision making.  I agree with the assessment and plan of care as documented in the note.      Exam as stated above performed by me. Patient 4/50/-2 with progression to 6cm.     Admit for spontaneous labor.     Alicia Myhre, DO  PGY2  Date of Service (when I saw the patient): 03/17/19 8:57 PM  OB/GYN Staff -- Pt seen and examined by me. Agree with note as above. Will also five IV Azithromycin given imminent delivery and current positive Chlamydia. MD Mabel

## 2019-03-18 NOTE — DISCHARGE SUMMARY
[facility] Discharge Summary    Chula Hitchcock MRN# [id number]   Age: 24 year old YOB: 1995     Date of Admission:  3/17/2019  Date of Discharge:  3/18/2019  Admitting Physician:  Althea Jose MD  Discharge Physician:  Althea Jose MD    Admit Dx:   -  at 40w0d  - Chlamydia  - Obesity  - Anemia    Discharge Dx:  - , s/p   - Liveborn female infant   - Same as above    Procedures:  - Spontaneous vaginal delivery  - Epidural analgesia    Admit HPI/Labor Course:  Chula Hitchcock is a 24 year old now  who presented at 40w0d for spontaneous labor.  Pregnancy complications included Anemia and chlamydia. She received PO Azithromycin 1000mg in triage and an additional 250mg IV for positive chlamydia test that was collected 3/16/19.  Labor course was uncomplicated.  She was noted to be complete at 0400.  She started pushing at 0407 and had a spontaneous vaginal delivery of a viable female infant in OA position at 0407. No nuchal cord was noted.  Apgars of 9 and 9 with weight pending.  The cord was double clamped after 60 seconds and cut.  A cord segment and cord blood were obtained.  Attempted spontaneous delivery of the placenta but brisk bleeding was noted. Exam revealed placenta in the cervical os with membranes adhered to uterus. Manual sweep was performed and placenta was delivered without further difficulty.   The uterus was noted to be firm with IV pitocin and fundal massage.  The perineum was assessed for lacerations and a small perineal abrasion and left sided labial abrasion was noted.  The lacerations were hemostatic and did not require repair. Total QBL was 122.  The placenta appeared intact with a 3V umbilical cord.  Dr. Jose was present for the entire procedure.     Please see her Admission H&P and Delivery Summary for further details.    Postpartum Course:  Her postpartum course was uncomplicated. Her partner did  receive a prescription for treatment of chlamydia. On PPD#1, she was meeting all of her postpartum goals and deemed stable for discharge. She was voiding without difficulty, tolerating a regular diet without nausea and vomiting, her pain was well controlled on oral pain medicines and her lochia was appropriate. Her hemoglobin prior to delivery was 10.9 and after delivery was 9.8. Her Rh status was positive, and Rhogam was not indicated.     Discharge Medications:     Review of your medicines      START taking      Dose / Directions   ibuprofen 800 MG tablet  Commonly known as:  ADVIL/MOTRIN      Dose:  800 mg  Take 1 tablet (800 mg) by mouth every 6 hours as needed for other (cramping)  Quantity:  60 tablet  Refills:  0        CONTINUE these medicines which may have CHANGED, or have new prescriptions. If we are uncertain of the size of tablets/capsules you have at home, strength may be listed as something that might have changed.      Dose / Directions   acetaminophen 325 MG tablet  Commonly known as:  TYLENOL  This may have changed:      medication strength    how much to take    when to take this    reasons to take this      Dose:  650 mg  Take 2 tablets (650 mg) by mouth every 4 hours as needed for mild pain or pain  Quantity:  60 tablet  Refills:  0     * ferrous gluconate 325 (36 Fe) MG Tabs  This may have changed:  Another medication with the same name was added. Make sure you understand how and when to take each.  Used for:  Anemia due to blood loss, acute      Dose:  324 mg  Take 324 mg by mouth 3 times daily (with meals)  Quantity:  90 tablet  Refills:  3     * ferrous gluconate 324 (38 Fe) MG tablet  Commonly known as:  FERGON  This may have changed:  You were already taking a medication with the same name, and this prescription was added. Make sure you understand how and when to take each.  Used for:  Acute on chronic blood loss anemia (H)      Dose:  324 mg  Take 1 tablet (324 mg) by mouth daily (with  breakfast)  Quantity:  90 tablet  Refills:  1         * This list has 2 medication(s) that are the same as other medications prescribed for you. Read the directions carefully, and ask your doctor or other care provider to review them with you.            CONTINUE these medicines which have NOT CHANGED      Dose / Directions   docusate sodium 100 MG tablet  Commonly known as:  COLACE  Used for:  Vaginal delivery      Dose:  100 mg  Take 100 mg by mouth daily  Quantity:  60 tablet  Refills:  1     prenatal multivitamin w/iron 27-0.8 MG tablet  Indication:  Pregnancy      Dose:  1 tablet  Take 1 tablet by mouth daily  Refills:  0        STOP taking    azithromycin 500 MG tablet  Commonly known as:  ZITHROMAX        hydrOXYzine 25 MG tablet  Commonly known as:  ATARAX              Where to get your medicines      These medications were sent to [place], [place] - [address]  [address] [place] [address], [facility] [address]    Phone:  [phone]     acetaminophen 325 MG tablet    ferrous gluconate 324 (38 Fe) MG tablet    ibuprofen 800 MG tablet       Discharge/Disposition:  Chula Hitchcock was discharged to home in stable condition with the following plans:     1) Call for temperature > 100.4, bright red vaginal bleeding >1 pad an hour x 2 hours, foul smelling vaginal discharge, pain not controlled by usual oral pain meds, persistent nausea and vomiting not controlled on medications  2) She desired a Nexplanon for contraception.  3) For feeding she decided to breastfeed.  4) She was instructed to follow-up with her primary OB in 6 weeks for a routine postpartum visit.    Karen Arora MD  Ob/Gyn, PGY-2  OB/GYN Staff -- Pt seen and examined by me. Agree with note as above.  MD Mabel  '

## 2019-03-18 NOTE — PROGRESS NOTES
Vaginal Delivery Note   of viable Female with Dr. Jose in attendance.  NICU/Nursery RN  present.  Infant with spontaneous cry, to mother's abdomen, dried and stimulated.  Placenta delivered with out complication, maine cares provided.  Mother and baby in stable condition.

## 2019-03-18 NOTE — PROGRESS NOTES
OB staff    Tracing reviewed.  Baseline 130  Currently no accels, now with intermittent late decels.  toco 3-4 in 10 min, inverted on tracing.  Decrease Pitocin back to 4 mu. Bolus fluid.    Continue to monitor carefully. Anticipate vag birth.    Althea Jose MD

## 2019-03-18 NOTE — ANESTHESIA PREPROCEDURE EVALUATION
"Anesthesia Pre-Procedure Evaluation    Patient: Chula Hitchcock   MRN:     [id number] Gender:   female   Age:    24 year old :      1995        Preoperative Diagnosis: * No surgery found *        No past medical history on file.   No past surgical history on file.       Anesthesia Evaluation       history and physical reviewed .      No history of anesthetic complications          ROS/MED HX    ENT/Pulmonary:  - neg pulmonary ROS     Neurologic:  - neg neurologic ROS    (-) seizures   Cardiovascular:  - neg cardiovascular ROS       METS/Exercise Tolerance:     Hematologic:         Musculoskeletal:         GI/Hepatic:  - neg GI/hepatic ROS      (-) GERD   Renal/Genitourinary:         Endo:         Psychiatric:         Infectious Disease:         Malignancy:         Other:                     [facility] AN PHYSICAL EXAM    Lab Results   Component Value Date    WBC 9.3 2019    HGB 10.9 (L) 2019    HCT 35.4 2019     2019    INR 0.93 2014    FIBR 487 (H) 2014       Preop Vitals  BP Readings from Last 3 Encounters:   19 134/58   19 136/62   14 116/56    Pulse Readings from Last 3 Encounters:   14 64      Resp Readings from Last 3 Encounters:   19 18   19 20   14 16    SpO2 Readings from Last 3 Encounters:   19 99%   14 99%      Temp Readings from Last 1 Encounters:   19 37  C (98.6  F) (Oral)    Ht Readings from Last 1 Encounters:   19 1.689 m (5' 6.5\")      Wt Readings from Last 1 Encounters:   19 108 kg (238 lb)    Estimated body mass index is 37.84 kg/m  as calculated from the following:    Height as of 3/16/19: 1.689 m (5' 6.5\").    Weight as of 3/16/19: 108 kg (238 lb).     LDA:            [facility] AN PLAN NO PONV RULE    neg OB ROS  (-) no pre-eclampsia                 Addy Finn, DO  "

## 2019-03-18 NOTE — PROGRESS NOTES
[facility]  Labor Progress Note    Subjective:  Patient resting comfortably with epidural. Feeling a little bit of pressure.    Objective:   Patient Vitals for the past 4 hrs:   BP Temp Temp src SpO2   19 2338 132/60 -- -- 98 %   19 2308 127/57 -- -- 97 %   196 139/66 -- -- --   190 137/61 -- -- --   197 134/61 -- -- --   192 133/60 -- -- --   195 134/58 -- -- 99 %   190 128/60 -- -- 98 %   19 2208 126/58 -- -- --   19 2205 125/60 -- -- 98 %   19 131/62 -- -- --   19 130/66 -- -- --   190 139/62 -- -- 99 %   196 137/69 98.6  F (37  C) Oral (!) 87 %   19 131/75 -- -- --     SVE: /0  Membranes: SROM 2300, clear fluid    FHT: Baseline 150, moderate variability, present accelerations, absent decelerations  [place]: 3 contractions in 10 minutes    Assessment  Ms. Chula Hitchcock is a 24 year old , at 40w0d by 19w5d US, who presents with painful contractions.     Plan  - Labor               - Admit to L&D for spontaneous labor. Expectant management               - Fen/GI: Clear liquid diet, IVF               - SVE: PRN               - Membranes: SROM clear                  - contractions spaced 1-2/10. Pitocin at 4mu/min               - Pain management: Desires epidural for analgesia               - Anticipate progression      - Positive CHPCRT               - s/p PO azithromycin 1000mg and 250mg IV      - Fetal Well Being               - Category I FHT. Reactive and reassuring               - Cephalic by BSUS. EFW                - Continue EFM and [place]     - PNC:                - Rh positive. Rubella immune. GBS unknown, antibiotics not indicated. No intervention indicated.                - Placenta: anterior         Karrie Myhre, DO  OB/GYN Resident, PGY-2  3/17/2019 10:56 PM

## 2019-03-19 VITALS
SYSTOLIC BLOOD PRESSURE: 98 MMHG | HEART RATE: 69 BPM | TEMPERATURE: 98.5 F | RESPIRATION RATE: 16 BRPM | DIASTOLIC BLOOD PRESSURE: 58 MMHG | OXYGEN SATURATION: 98 %

## 2019-03-19 PROBLEM — Z37.9 NORMAL LABOR: Status: ACTIVE | Noted: 2019-03-19

## 2019-03-19 LAB — HGB BLD-MCNC: 9.8 G/DL (ref 11.7–15.7)

## 2019-03-19 PROCEDURE — 25000132 ZZH RX MED GY IP 250 OP 250 PS 637: Performed by: STUDENT IN AN ORGANIZED HEALTH CARE EDUCATION/TRAINING PROGRAM

## 2019-03-19 PROCEDURE — 85018 HEMOGLOBIN: CPT | Performed by: STUDENT IN AN ORGANIZED HEALTH CARE EDUCATION/TRAINING PROGRAM

## 2019-03-19 PROCEDURE — 36415 COLL VENOUS BLD VENIPUNCTURE: CPT | Performed by: STUDENT IN AN ORGANIZED HEALTH CARE EDUCATION/TRAINING PROGRAM

## 2019-03-19 RX ORDER — FERROUS GLUCONATE 324(38)MG
324 TABLET ORAL
Qty: 90 TABLET | Refills: 1 | Status: SHIPPED | OUTPATIENT
Start: 2019-03-19

## 2019-03-19 RX ADMIN — ACETAMINOPHEN 650 MG: 325 TABLET, FILM COATED ORAL at 08:30

## 2019-03-19 RX ADMIN — IBUPROFEN 800 MG: 800 TABLET, FILM COATED ORAL at 08:30

## 2019-03-19 RX ADMIN — SENNOSIDES AND DOCUSATE SODIUM 2 TABLET: 8.6; 5 TABLET ORAL at 08:30

## 2019-03-19 RX ADMIN — ACETAMINOPHEN 650 MG: 325 TABLET, FILM COATED ORAL at 00:31

## 2019-03-19 RX ADMIN — IBUPROFEN 800 MG: 800 TABLET, FILM COATED ORAL at 00:31

## 2019-03-19 NOTE — PLAN OF CARE
Pt instructions & prescriptions reviewed. Pt had no questions & verbalized understanding her plan. Referrals made for OB home visit. Instructed to follow up at clinic within 6 weeks for PP check.

## 2019-03-19 NOTE — PLAN OF CARE
VSS. Postpartum assessments WDL. Breastfeeding independently. Tylenol and Ibuprofen administered for cramping pain. Voiding adequately. No nausea/vomiting. Able to make needs known. Significant other in room, supportive with cares and interactive with infant. Continue plan of care.

## 2019-04-04 ENCOUNTER — DOCUMENTATION ONLY (OUTPATIENT)
Dept: OBGYN | Facility: CLINIC | Age: 24
End: 2019-04-04

## 2019-04-04 NOTE — PROGRESS NOTES
[facility] will be sharing updates with you on Maternal Child Health Referral requests for home care services.  This is for care coordination purposes and alert you to referral status.  We received the referral for  Chula Hitchcock; MRN [id number] and want to update you:    [facility] has made 2 attempts to contact patient by phone and text message over the last 4 days.   We have not had any response from patient.  Final message was left advising patient to follow up with Primary Care Providers for mom and baby.    Sincerely [facility]  Austen Sims  [phone]

## 2019-10-24 ENCOUNTER — HOSPITAL ENCOUNTER (EMERGENCY)
Facility: CLINIC | Age: 24
Discharge: HOME | End: 2019-10-24
Payer: COMMERCIAL

## 2019-10-24 VITALS
RESPIRATION RATE: 18 BRPM | HEIGHT: 67 IN | WEIGHT: 238.9 LBS | HEART RATE: 82 BPM | OXYGEN SATURATION: 97 % | DIASTOLIC BLOOD PRESSURE: 109 MMHG | SYSTOLIC BLOOD PRESSURE: 156 MMHG | TEMPERATURE: 97.6 F | BODY MASS INDEX: 37.49 KG/M2

## 2019-10-24 ASSESSMENT — MIFFLIN-ST. JEOR: SCORE: 1861.27

## 2019-10-24 NOTE — ED TRIAGE NOTES
Pt arrives s/p fall down 1 flight of stairs yesterday evening. States she holding groceries when her slipper caught. Fell onto concrete floor, did hit head, unknown if LOC. Endorses pain to L side today. Able to ambulate well. Pt is not on anticoagulants. Denies SOB.

## 2019-10-24 NOTE — ED TRIAGE NOTES
"Pt LWBS after triage. States, \"I can't wait any longer\". Pt signed AMA form. Understands risks of leaving. Ambulates with steady gait out of lobby.   "
